# Patient Record
Sex: FEMALE | Race: WHITE | NOT HISPANIC OR LATINO | URBAN - METROPOLITAN AREA
[De-identification: names, ages, dates, MRNs, and addresses within clinical notes are randomized per-mention and may not be internally consistent; named-entity substitution may affect disease eponyms.]

---

## 2022-03-31 ENCOUNTER — OFFICE VISIT (OUTPATIENT)
Dept: OBSTETRICS AND GYNECOLOGY | Facility: CLINIC | Age: 37
End: 2022-03-31

## 2022-03-31 VITALS
BODY MASS INDEX: 23.36 KG/M2 | DIASTOLIC BLOOD PRESSURE: 70 MMHG | WEIGHT: 119 LBS | HEIGHT: 60 IN | SYSTOLIC BLOOD PRESSURE: 112 MMHG

## 2022-03-31 DIAGNOSIS — Z01.419 ROUTINE GYNECOLOGICAL EXAMINATION: Primary | ICD-10-CM

## 2022-03-31 DIAGNOSIS — N64.4 BREAST PAIN, LEFT: ICD-10-CM

## 2022-03-31 DIAGNOSIS — N63.11 BREAST LUMP ON RIGHT SIDE AT 10 O'CLOCK POSITION: ICD-10-CM

## 2022-03-31 DIAGNOSIS — Z01.419 PAP TEST, AS PART OF ROUTINE GYNECOLOGICAL EXAMINATION: ICD-10-CM

## 2022-03-31 PROCEDURE — 99212 OFFICE O/P EST SF 10 MIN: CPT | Performed by: NURSE PRACTITIONER

## 2022-03-31 PROCEDURE — 99385 PREV VISIT NEW AGE 18-39: CPT | Performed by: NURSE PRACTITIONER

## 2022-03-31 NOTE — PROGRESS NOTES
GYN Annual Exam     CC - Here for annual exam.        KILEY Hutchison is a 36 y.o. female, , who presents for annual well woman exam. Patient's last menstrual period was 2022..  Periods are regular every 25-35 days, lasting 7-10 days. .  Dysmenorrhea:mild, occurring first 1-2 days of flow.  Patient reports problems with: L breast pain-states intermittent x3 months-notices around time of ovulation.  Since her last visit the patient was diagnosed with POTS. Partner Status: Marital Status: .  She is sexually active. She has not had new partners since her last STD testing. She does not desire STD testing. .    Additional OB/GYN History   Current contraception: contraceptive methods: Vasectomy   Desires to: do not start contraception  Last Pap : 2019. Result: negative  Last Completed Pap Smear     This patient has no relevant Health Maintenance data.        History of abnormal Pap smear: no  Family history of uterine, colon, breast, or ovarian cancer: no  Performs monthly Self-Breast Exam: yes  Exercises Regularly:yes  Feelings of Anxiety or Depression: no  Tobacco Usage?: No   OB History        1    Para   1    Term   1       0    AB   0    Living   1       SAB   0    IAB   0    Ectopic   0    Molar   0    Multiple   0    Live Births   1                Health Maintenance   Topic Date Due   • Annual Gynecologic Pelvic and Breast Exam  Never done   • ANNUAL PHYSICAL  Never done   • TDAP/TD VACCINES (2 - Td or Tdap) 2019   • INFLUENZA VACCINE  Never done   • HEPATITIS C SCREENING  Never done   • PAP SMEAR  2022   • COVID-19 Vaccine  Completed   • Pneumococcal Vaccine 0-64  Aged Out       The additional following portions of the patient's history were reviewed and updated as appropriate: allergies, current medications, past family history, past medical history, past social history and past surgical history.    Review of Systems   Constitutional: Negative.    Cardiovascular:  "Negative.    Gastrointestinal: Negative.    Genitourinary: Negative.  Positive for breast pain.   Psychiatric/Behavioral: Negative.          I have reviewed and agree with the HPI, ROS, and historical information as entered above. Nida Valle, APRN    Objective   /70   Ht 152.4 cm (60\")   Wt 54 kg (119 lb)   LMP 03/03/2022   Breastfeeding No   BMI 23.24 kg/m²     Physical Exam  Vitals and nursing note reviewed. Exam conducted with a chaperone present.   Constitutional:       Appearance: She is well-developed.   HENT:      Head: Normocephalic and atraumatic.   Neck:      Thyroid: No thyroid mass or thyromegaly.   Cardiovascular:      Rate and Rhythm: Normal rate and regular rhythm.      Heart sounds: No murmur heard.  Pulmonary:      Effort: Pulmonary effort is normal. No retractions.      Breath sounds: Normal breath sounds. No wheezing, rhonchi or rales.   Chest:      Chest wall: No mass or tenderness.   Breasts:      Right: Mass present. No nipple discharge, skin change or tenderness.      Left: Tenderness present. No mass, nipple discharge or skin change.        Comments: Approx 3cm Right breast lump at 10 and 11 o'clock position approx 1\" from areola,  Left breast pain  Abdominal:      General: Bowel sounds are normal.      Palpations: Abdomen is soft. Abdomen is not rigid. There is no mass.      Tenderness: There is no abdominal tenderness. There is no guarding.      Hernia: No hernia is present.   Genitourinary:     General: Normal vulva.      Labia:         Right: No rash, tenderness or lesion.         Left: No rash, tenderness or lesion.       Vagina: Normal. No vaginal discharge or lesions.      Cervix: No cervical motion tenderness, discharge, lesion or cervical bleeding.      Uterus: Normal. Not enlarged, not fixed and not tender.       Adnexa: Right adnexa normal and left adnexa normal.        Right: No mass or tenderness.          Left: No mass or tenderness.        Rectum: Normal. No " external hemorrhoid.   Musculoskeletal:      Cervical back: Normal range of motion. No muscular tenderness.   Neurological:      Mental Status: She is alert and oriented to person, place, and time.   Psychiatric:         Behavior: Behavior normal.            Assessment and Plan    Problem List Items Addressed This Visit    None     Visit Diagnoses     Routine gynecological examination    -  Primary    Pap test, as part of routine gynecological examination        Relevant Orders    Pap IG, Rfx HPV ASCU    Breast lump on right side at 10 o'clock position        Relevant Orders    Mammo Diagnostic Digital Tomosynthesis Bilateral With CAD    Breast pain, left        Relevant Orders    Mammo Diagnostic Digital Tomosynthesis Bilateral With CAD          1. GYN annual well woman exam.   2. Will order Dx bilateral mammogram for right breast lump and left breast pain  3. Reviewed monthly self breast exams.  Instructed to call with lumps, pain, or breast discharge.    4. Reviewed exercise as a preventative health measures.   5. Reccommended Flu Vaccine in Fall of each year.  6. RTC in 1 year or PRN with problems        Nida Valle, APRN  03/31/2022

## 2022-04-05 ENCOUNTER — TELEPHONE (OUTPATIENT)
Dept: OBSTETRICS AND GYNECOLOGY | Facility: CLINIC | Age: 37
End: 2022-04-05

## 2022-04-05 NOTE — TELEPHONE ENCOUNTER
Patient called stating she wants to have her mammogram done in [place] at [facility]. Needs order sent to the fax # [phone]

## 2022-04-07 ENCOUNTER — TELEPHONE (OUTPATIENT)
Dept: OBSTETRICS AND GYNECOLOGY | Facility: CLINIC | Age: 37
End: 2022-04-07

## 2022-04-15 DIAGNOSIS — Z01.419 PAP TEST, AS PART OF ROUTINE GYNECOLOGICAL EXAMINATION: ICD-10-CM

## 2022-05-16 ENCOUNTER — APPOINTMENT (OUTPATIENT)
Dept: MAMMOGRAPHY | Facility: HOSPITAL | Age: 37
End: 2022-05-16

## 2022-05-23 ENCOUNTER — TELEPHONE (OUTPATIENT)
Dept: OBSTETRICS AND GYNECOLOGY | Facility: CLINIC | Age: 37
End: 2022-05-23

## 2022-05-23 NOTE — TELEPHONE ENCOUNTER
Pt contacted and she did have the dx mamm and US 4/13/2022. She said the images where being and that her PCP got the report. I call [facility] and they are faxing those reports to our office, provider JUAN Valle. YOUSIF

## 2024-05-09 ENCOUNTER — OFFICE VISIT (OUTPATIENT)
Dept: OBSTETRICS AND GYNECOLOGY | Facility: CLINIC | Age: 39
End: 2024-05-09
Payer: COMMERCIAL

## 2024-05-09 VITALS
SYSTOLIC BLOOD PRESSURE: 128 MMHG | BODY MASS INDEX: 24.15 KG/M2 | WEIGHT: 123 LBS | DIASTOLIC BLOOD PRESSURE: 88 MMHG | HEIGHT: 60 IN

## 2024-05-09 DIAGNOSIS — R10.2 PELVIC PAIN SYNDROME: ICD-10-CM

## 2024-05-09 DIAGNOSIS — Z01.419 ROUTINE GYNECOLOGICAL EXAMINATION: Primary | ICD-10-CM

## 2024-05-14 LAB — REF LAB TEST METHOD: NORMAL

## 2024-06-12 ENCOUNTER — OFFICE VISIT (OUTPATIENT)
Dept: OBSTETRICS AND GYNECOLOGY | Facility: CLINIC | Age: 39
End: 2024-06-12
Payer: COMMERCIAL

## 2024-06-12 VITALS
DIASTOLIC BLOOD PRESSURE: 84 MMHG | WEIGHT: 125.8 LBS | SYSTOLIC BLOOD PRESSURE: 112 MMHG | HEIGHT: 60 IN | BODY MASS INDEX: 24.7 KG/M2

## 2024-06-12 DIAGNOSIS — N30.10 INTERSTITIAL CYSTITIS: ICD-10-CM

## 2024-06-12 DIAGNOSIS — R10.2 PELVIC PAIN SYNDROME: Primary | ICD-10-CM

## 2024-06-12 NOTE — PROGRESS NOTES
Chief Complaint   Patient presents with    Follow-up     Pelvic pain       Subjective   HPI  Jany Hutchison is a 39 y.o. female, . Patient's last menstrual period was 2024 (exact date). She presents for follow up on pelvic pain .      Patient was last seen on 24 for her annual exam and reported pelvic/abdominal pain and pressure. The plan was to follow up with an US in 4 weeks. Since then she reports her symptoms have improved slightly with the use of cimetidine, she started treating herself for interstitial cystitis. The patient reports additional symptoms as none.      TVUS performed today. Findings show normal size uterus, arcuate vs septate. ES 11.3 mm, pt is midway through cycle. Ovaries normal size bilaterally, with 1.9cm simple cyst on left ovary.       Additional OB/GYN History     Last Pap :   Last Completed Pap Smear            PAP SMEAR (Yearly) Next due on 2024  LIQUID-BASED PAP SMEAR WITH HPV GENOTYPING REGARDLESS OF INTERPRETATION (HERMINIA,COR,MAD)    2022  SCANNED - PAP SMEAR    2019  Done - negative    2019  Done - negative                    Last mammogram:   Last Completed Mammogram       This patient has no relevant Health Maintenance data.            Tobacco Usage?: No   OB History          1    Para   1    Term   1       0    AB   0    Living   1         SAB   0    IAB   0    Ectopic   0    Molar   0    Multiple   0    Live Births   1                No current outpatient medications on file.     Past Medical History:   Diagnosis Date    Breast pain     Gestational hypertension     History of cardiac arrhythmia     History of chronic constipation     History of pregnancy induced hypertension     PMS (premenstrual syndrome)     POTS (postural orthostatic tachycardia syndrome)     Preeclampsia     Premenstrual tension syndrome     Seborrheic keratoses     (suspected)        Past Surgical History:   Procedure  "Laterality Date     SECTION         The additional following portions of the patient's history were reviewed and updated as appropriate: allergies and current medications.    Review of Systems   Constitutional: Negative.    HENT: Negative.     Eyes: Negative.    Respiratory: Negative.     Cardiovascular: Negative.    Gastrointestinal:  Positive for abdominal distention.   Endocrine: Negative.    Genitourinary:  Positive for pelvic pain and pelvic pressure.   Musculoskeletal: Negative.    Skin: Negative.    Allergic/Immunologic: Negative.    Neurological: Negative.    Hematological: Negative.    Psychiatric/Behavioral: Negative.         I have reviewed and agree with the HPI, ROS, and historical information as entered above. YOUSIF Bright      Objective   /84   Ht 152.4 cm (60\")   Wt 57.1 kg (125 lb 12.8 oz)   LMP 2024 (Exact Date)   BMI 24.57 kg/m²     Physical Exam  Vitals and nursing note reviewed.   Constitutional:       Appearance: Normal appearance. She is normal weight.   Neurological:      Mental Status: She is alert and oriented to person, place, and time.         Assessment & Plan     Assessment     Problem List Items Addressed This Visit    None  Visit Diagnoses       Pelvic pain syndrome    -  Primary    Interstitial cystitis        Relevant Orders    Ambulatory Referral to Urology              Plan     Pt is treating herself for IC- discussed the trigger foods/drinks/substances for IC and what to avoid. May try prelief supplement as well. Referral placed for urology to evaluate.   Continue symptom diary until seeing urology.   Return in about 1 year (around 2025) for Annual physical.        YOUSIF Bright  2024  "

## 2025-06-13 ENCOUNTER — OFFICE VISIT (OUTPATIENT)
Dept: OBSTETRICS AND GYNECOLOGY | Facility: CLINIC | Age: 40
End: 2025-06-13
Payer: COMMERCIAL

## 2025-06-13 VITALS
BODY MASS INDEX: 24.94 KG/M2 | WEIGHT: 127 LBS | SYSTOLIC BLOOD PRESSURE: 102 MMHG | HEIGHT: 60 IN | DIASTOLIC BLOOD PRESSURE: 74 MMHG

## 2025-06-13 DIAGNOSIS — Z12.31 BREAST CANCER SCREENING BY MAMMOGRAM: ICD-10-CM

## 2025-06-13 DIAGNOSIS — Z01.419 ROUTINE GYNECOLOGICAL EXAMINATION: Primary | ICD-10-CM

## 2025-06-13 DIAGNOSIS — N92.0 MENORRHAGIA WITH REGULAR CYCLE: ICD-10-CM

## 2025-06-13 PROCEDURE — 99396 PREV VISIT EST AGE 40-64: CPT | Performed by: ADVANCED PRACTICE MIDWIFE

## 2025-06-13 NOTE — PROGRESS NOTES
Gynecologic Annual Exam Note          GYN Annual Exam     Gynecologic Exam        Subjective     HPI  Jnay Hutchison is a 40 y.o. female, , who presents for annual well woman exam as an established patient. There were no changes to her medical or surgical history since her last visit.  Patient's last menstrual period was 2025 (exact date).  Her periods irregular, between 23-30 days, used to be every 28 days to the day. She reports for past 3 cycles periods are becoming heavier with clots.  She reports dysmenorrhea is moderate occurring throughout cycle. Marital Status: . She is sexually active. She has not had new partners.. STD testing recommendations have been explained to the patient and she declines STD testing.    The patient would like to discuss the following complaints today: menstrual cycle changes, vaginal dryness, acne and pelvic pain    Most of her symptoms of IC occur around ovulation time, wine and diet coke are big triggers. She states she didn't see urology because she didn't want to waste money on them when there isn't a lot to do for it.   Ultrasound was done last , there was a small hypoechoic area in [facility], due to worsening symptoms we will repeat.     Additional OB/GYN History   contraceptive methods: Vasectomy   Desires to: do not start contraception  History of migraines: no    Last Pap : 2024. Result: negative. HPV: negative.   Last Completed Pap Smear    This patient has no relevant Health Maintenance data.       History of abnormal Pap smear: no  Family history of uterine, colon, breast, or ovarian cancer: yes - Breast: mother  Performs monthly Self-Breast Exam: yes  Last mammogram: . Done at [facility]   Last Completed Mammogram            Awaiting Completion       MAMMOGRAM (Every 2 Years) Order placed this encounter      2025  Order placed for Mammo Screening Digital Tomosynthesis Bilateral With CAD by Carlita Pop APRN                             Colonoscopy: several years ago, WNL   Exercises Regularly: yes  Feelings of Anxiety or Depression: no  Tobacco Usage?: No     No current outpatient medications on file.     Patient denies the need for medication refills today.    OB History          1    Para   1    Term   1       0    AB   0    Living   1         SAB   0    IAB   0    Ectopic   0    Molar   0    Multiple   0    Live Births   1                Past Medical History:   Diagnosis Date    Breast pain     Gestational hypertension     History of cardiac arrhythmia     History of chronic constipation     History of pregnancy induced hypertension     PMS (premenstrual syndrome)     POTS (postural orthostatic tachycardia syndrome)     Preeclampsia     Premenstrual tension syndrome     Seborrheic keratoses     (suspected)        Past Surgical History:   Procedure Laterality Date     SECTION         Health Maintenance   Topic Date Due    TDAP/TD VACCINES (2 - Td or Tdap) 2019    HEPATITIS C SCREENING  Never done    ANNUAL PHYSICAL  Never done    COVID-19 Vaccine ( season) 2024    PAP SMEAR  2025    Annual Gynecologic Pelvic and Breast Exam  05/10/2025    MAMMOGRAM  2025    INFLUENZA VACCINE  2025    Pneumococcal Vaccine 0-49  Aged Out       The additional following portions of the patient's history were reviewed and updated as appropriate: allergies, current medications, past family history, past medical history, past social history, past surgical history, and problem list.    Review of Systems   Constitutional: Negative.    HENT: Negative.     Eyes: Negative.    Respiratory: Negative.     Cardiovascular: Negative.    Gastrointestinal: Negative.    Endocrine: Negative.    Genitourinary:  Positive for menstrual problem and pelvic pain.        Vaginal dryness    Musculoskeletal: Negative.    Skin: Negative.    Allergic/Immunologic: Negative.    Neurological: Negative.    Hematological:  "Negative.    Psychiatric/Behavioral: Negative.           I have reviewed and agree with the HPI, ROS, and historical information as entered above. Carlita Pop, APRN          Objective   /74 (BP Location: Right arm)   Ht 152.4 cm (60\")   Wt 57.6 kg (127 lb)   LMP 06/09/2025 (Exact Date)   BMI 24.80 kg/m²     Physical Exam  Vitals and nursing note reviewed. Exam conducted with a chaperone present.   Constitutional:       General: She is not in acute distress.     Appearance: Normal appearance. She is well-developed. She is not ill-appearing.   Neck:      Thyroid: No thyroid mass or thyromegaly.   Pulmonary:      Effort: Pulmonary effort is normal. No respiratory distress or retractions.   Chest:      Chest wall: No mass.   Breasts:     Right: Normal. No mass, nipple discharge, skin change or tenderness.      Left: Normal. No mass, nipple discharge, skin change or tenderness.      Comments: Fibrocystic tissue present bilaterally  Very dense upper pole, R>L  Abdominal:      General: There is no distension.      Palpations: Abdomen is soft. Abdomen is not rigid. There is no mass.      Tenderness: There is no abdominal tenderness. There is no guarding or rebound.      Hernia: No hernia is present.   Genitourinary:     General: Normal vulva.      Exam position: Lithotomy position.      Labia:         Right: No rash, tenderness or lesion.         Left: No rash, tenderness or lesion.       Vagina: Normal. No vaginal discharge, bleeding or lesions.      Cervix: Normal. No cervical motion tenderness, discharge or cervical bleeding.      Uterus: Normal. Not enlarged, not fixed and not tender.       Adnexa: Right adnexa normal and left adnexa normal.        Right: No mass or tenderness.          Left: No mass or tenderness.        Rectum: Normal. No external hemorrhoid.   Musculoskeletal:      Cervical back: No muscular tenderness.   Skin:     General: Skin is warm and dry.   Neurological:      Mental Status: She is " "alert and oriented to person, place, and time.   Psychiatric:         Mood and Affect: Mood normal.         Behavior: Behavior normal.            Assessment and Plan    Problem List Items Addressed This Visit    None  Visit Diagnoses         Routine gynecological examination    -  Primary      Breast cancer screening by mammogram        Relevant Orders    Mammo Screening Digital Tomosynthesis Bilateral With CAD      Menorrhagia with regular cycle                GYN annual well woman exam.   Pap guidelines reviewed.  Discussed libido concerns. We reviewed the female arousal cycle and its relation to sexual desire. I recommend the books Come as you are and Come together by Angy Sarabia, a sex therapist. These are available in audiobook form, e-readers, and physical copies. The [employer] has them as well. Angy Sarabia also has a podcast by the same name. I also strongly recommend another podcast from a couples therapist and sex therapist called \"[employer]\".   Discussed heavier menses, recommend returning for ultrasound to evaluate previous hypoechoic area in uterus and increasing symptoms.  Reviewed monthly self breast exams.  Instructed to call with lumps, pain, or breast discharge.    Ordered Mammogram today  Symptoms of menopausal transition reviewed with patient.   RTC in 1 year or PRN with problems.  Return in about 2 weeks (around 6/27/2025) for ultrasound, soonest possible.     Carlita Pop, APRN  06/13/2025    " 5